# Patient Record
Sex: FEMALE | Race: WHITE | NOT HISPANIC OR LATINO | URBAN - METROPOLITAN AREA
[De-identification: names, ages, dates, MRNs, and addresses within clinical notes are randomized per-mention and may not be internally consistent; named-entity substitution may affect disease eponyms.]

---

## 2019-07-22 PROCEDURE — 87186 SC STD MICRODIL/AGAR DIL: CPT | Performed by: EMERGENCY MEDICINE

## 2019-07-22 PROCEDURE — 87086 URINE CULTURE/COLONY COUNT: CPT | Performed by: EMERGENCY MEDICINE

## 2019-07-22 PROCEDURE — 87088 URINE BACTERIA CULTURE: CPT | Performed by: EMERGENCY MEDICINE

## 2021-03-28 ENCOUNTER — IMMUNIZATION (OUTPATIENT)
Dept: LAB | Age: 21
End: 2021-03-28

## 2021-03-28 DIAGNOSIS — Z23 NEED FOR VACCINATION: Primary | ICD-10-CM

## 2021-03-28 PROCEDURE — 0001A COVID 19 PFIZER-BIONTECH: CPT

## 2021-03-28 PROCEDURE — 91300 COVID 19 PFIZER-BIONTECH: CPT

## 2021-04-19 ENCOUNTER — IMMUNIZATION (OUTPATIENT)
Dept: LAB | Age: 21
End: 2021-04-19
Attending: HOSPITALIST

## 2021-04-19 DIAGNOSIS — Z23 NEED FOR VACCINATION: Primary | ICD-10-CM

## 2021-04-19 PROCEDURE — 91300 COVID 19 PFIZER-BIONTECH: CPT

## 2021-04-19 PROCEDURE — 0002A COVID 19 PFIZER-BIONTECH: CPT

## 2022-01-11 ENCOUNTER — OFFICE VISIT (OUTPATIENT)
Dept: FAMILY MEDICINE CLINIC | Facility: CLINIC | Age: 22
End: 2022-01-11
Payer: COMMERCIAL

## 2022-01-11 VITALS — HEIGHT: 71.5 IN | BODY MASS INDEX: 24.24 KG/M2 | WEIGHT: 177 LBS

## 2022-01-11 DIAGNOSIS — U07.1 COVID-19: Primary | ICD-10-CM

## 2022-01-11 DIAGNOSIS — R09.82 PND (POST-NASAL DRIP): ICD-10-CM

## 2022-01-11 PROCEDURE — 3008F BODY MASS INDEX DOCD: CPT | Performed by: FAMILY MEDICINE

## 2022-01-11 PROCEDURE — 99203 OFFICE O/P NEW LOW 30 MIN: CPT | Performed by: FAMILY MEDICINE

## 2022-01-11 RX ORDER — FLUTICASONE PROPIONATE 50 MCG
SPRAY, SUSPENSION (ML) NASAL
COMMUNITY
Start: 2021-10-01

## 2022-01-11 RX ORDER — ALBUTEROL SULFATE 90 UG/1
2 AEROSOL, METERED RESPIRATORY (INHALATION) EVERY 4 HOURS PRN
COMMUNITY
Start: 2022-01-02

## 2022-01-11 NOTE — PROGRESS NOTES
No chief complaint on file. HPI:   Dago Akbar is a 19year old female who presents to the office as a new patient for evaluation of chest pressure. Exposed to [employer] on the 21st of Dec.  Tested 13 times since then - PCR and rapid.   All negat help, rest and hydrate  Given the duration, I would imagine that she is no longer contagious at this time. Continue to exercise caution until symptoms resolve.   For school, she has a week of virtual classes  It sounds like the chest symptoms she was havin

## 2022-03-13 NOTE — Clinical Note
Pediatric Discharge Summary Report       Admission Date: 3/8/2022  Discharge Date: 03/13/22    Discharge Diagnosis:Hypotension  Problem List:   Active Hospital Problems    Diagnosis    • Hypotension        Reason for Admission: Hypotension    HPI:  The patient is a 15-year-old female with a history of anxiety and depression who presents for hypotension after her blood pressure was noted to be 80s over 50s at PMD office. Patient reports she has a history of bicuspid aortic valve, and abnormal aortic arch for which she takes no medications or has not had surgeries, and has no activity restrictions.  Patient does endorse losing some weight over the past year and a half that is not specifically intentional, but does report she is very busy and cannot always eat more than 1 meal a day which is usually dinner.     Patient denies calorie counting, denies intentionally making herself vomit, denies excessive exercise reports she does not like to exercise. Patient endorses feeling lightheaded at times especially over the past few days to weeks which was brought up at PMDs office and then patient was sent to this ED.  Patient reports normal urine output, does endorse vaping nicotine, smoking weed, and occasional alcohol use, denies any other drugs. Patient reports she lives with her dad she feels safe at home, she has a sibling who lives in [place], and a brother who visits their home on the weekends, and parents are .  Patient does endorse a history of self injury but reports has not self injured in a couple of months.  Patient denies SI, does endorse at times feeling stressed and overwhelmed and wanting to start talking to a therapist or counselor again. Previous therapist was not a good fit.    When mom arrives mom reports she is concerned that patient is purposefully withholding eating and does not like how her body looks, mom also reports that patient disclosed that she intentionally tried to overdose on Benadryl  Referring:  Shante Paez MD  [address] [place]    Dear Dr. Melissa Amaya MD    Thank you for referring your patient to me for an evaluation. Please see my attached note for my findings and recommendations. Should you have any questions or concerns, please do not hesitate to contact me at the number listed below.       Best Regards,      Dorinda Chowdhury MD  [address] [address]  [phone] about a month ago and did not tell anybody about it until later.    ED Course:  Cardiology consulted in the ED regarding bicuspid aortic valve, EKG obtained showed NSR and borderline ST elevation, anterior leads likely benign repol N/S inf T changes. Labs obtained in ED - CMP unremarkable, Mag wnl, alcohol negative, UA unremarkable, acetaminophen level negative, salicylate level negative, phosphorus wnl, TSH wnl, CBC showed mildly elevated WBC at 12.0, troponin wnl, BNP wnl, covid negative, hcg negative. UDS positive for cannabis, though patient does endorse marijuana use. Admitted to the floor given weight loss and hypotension with plan for echo on the floor.    Hospital Course:  Patient was started on the nutritional rehabilitation protocol. Patient was noncompliant and refused the NG tube. Family was also noncompliant and refused the NG tube for the patient. Throughout hospital course, patient vomited and her nausea was managed with zofran. Patient was started on multivitamin, thiamine, zinc, and vitamin D supplements. BMP, Mag and phos were trended. BMP showed mild hyponatremia at 132 and 133 but resolved prior to discharge. Otherwise electrolytes remained stable. Patient complained of constipation and was treated with miralax, senokot and lactulose. Patient was deemed safe at time of discharge and recommended close outpatient follow up.     Echo showed bicuspid aortic valve, tricuspid regurgitation, and aortic root dilation.     Condition on Discharge: Stable  Discharge Instructions: Follow up with PCP and psych.  Activity:   Exercise restriction  Diet:  general diet    Follow-up: Patient advised to go to PCP office on 3/14 for close follow up.   PMD: Karel Hanson DO      Medications:      Summary of your Discharge Medications      Take these Medications      Details   childrens multivitamin with C & FA chewable tablet  Start taking on: March 14, 2022   Chew 1 tablet by mouth daily. Do not start before March  14, 2022.     docusate sodium-sennosides 50-8.6 MG per tablet  Commonly known as: SENOKOT S   Take 1 tablet by mouth 2 times daily as needed for Constipation.     polyethylene glycol 17 g packet  Commonly known as: MIRALAX   Take 17 g by mouth daily as needed (constipation). Stir and dissolve powder in any 4 to 8 ounces of beverage, then drink.     thiamine 100 MG tablet  Commonly known as: VITAMIN B1  Start taking on: March 14, 2022   Take 1 tablet by mouth daily. Do not start before March 14, 2022.     Vitamin D3 25 MCG Tab  Start taking on: March 14, 2022   Take 4 tablets by mouth daily. Do not start before March 14, 2022.  Comment: 25 mcg = 1,000 units     zinc sulfate 220 (50 Zn) MG capsule  Commonly known as: ZINCATE  Start taking on: March 14, 2022   Take 1 capsule by mouth daily. Do not start before March 14, 2022.            Discharge Exam:  Physical Exam  Constitutional:       General: She is not in acute distress.     Appearance: She is not toxic-appearing.   HENT:      Head: Normocephalic and atraumatic.      Right Ear: External ear normal.      Left Ear: External ear normal.      Nose: Nose normal. No congestion or rhinorrhea.      Mouth/Throat:      Mouth: Mucous membranes are moist.      Comments: Mouth piercing      Neck: Normal range of motion.   Eyes:      General:         Right eye: No discharge.         Left eye: No discharge.      Conjunctiva/sclera: Conjunctivae normal.   Cardiovascular:      Rate and Rhythm: Normal rate and regular rhythm.      Pulses: Normal pulses.      Heart sounds: Normal heart sounds. No murmur heard.    No friction rub. No gallop.   Pulmonary:      Effort: Pulmonary effort is normal. No respiratory distress.      Breath sounds: Normal breath sounds.   Abdominal:      General: Abdomen is flat. Bowel sounds are normal.      Palpations: Abdomen is soft.   Musculoskeletal:         General: Normal range of motion.   Skin:     General: Skin is warm and dry.      Capillary  Refill: Capillary refill takes less than 2 seconds.   Neurological:      Mental Status: She is alert.            Thank you for allowing us to participate in the care of this patient.       Sapphire Frias, DO  Pediatrics, PGY-1  3/13/2022 6:17 PM

## 2022-04-11 ENCOUNTER — OFFICE VISIT (OUTPATIENT)
Dept: FAMILY MEDICINE CLINIC | Facility: CLINIC | Age: 22
End: 2022-04-11
Payer: COMMERCIAL

## 2022-04-11 VITALS
HEIGHT: 71.75 IN | BODY MASS INDEX: 24.79 KG/M2 | HEART RATE: 103 BPM | SYSTOLIC BLOOD PRESSURE: 114 MMHG | DIASTOLIC BLOOD PRESSURE: 80 MMHG | RESPIRATION RATE: 14 BRPM | TEMPERATURE: 97 F | OXYGEN SATURATION: 98 % | WEIGHT: 181 LBS

## 2022-04-11 DIAGNOSIS — M79.10 MYALGIA: ICD-10-CM

## 2022-04-11 DIAGNOSIS — M25.50 POLYARTHRALGIA: ICD-10-CM

## 2022-04-11 DIAGNOSIS — R23.8 EASY BRUISING: Primary | ICD-10-CM

## 2022-04-11 DIAGNOSIS — Z86.16 HISTORY OF COVID-19: ICD-10-CM

## 2022-04-11 PROBLEM — H93.13 BILATERAL TINNITUS: Status: ACTIVE | Noted: 2022-04-11

## 2022-04-11 PROBLEM — H93.13 BILATERAL TINNITUS: Status: RESOLVED | Noted: 2022-04-11 | Resolved: 2022-04-11

## 2022-04-11 LAB
ALBUMIN SERPL-MCNC: 3.6 G/DL (ref 3.4–5)
ALBUMIN/GLOB SERPL: 1 {RATIO} (ref 1–2)
ALP LIVER SERPL-CCNC: 72 U/L
ALT SERPL-CCNC: 17 U/L
ANION GAP SERPL CALC-SCNC: 3 MMOL/L (ref 0–18)
AST SERPL-CCNC: 6 U/L (ref 15–37)
BASOPHILS # BLD AUTO: 0.03 X10(3) UL (ref 0–0.2)
BASOPHILS NFR BLD AUTO: 0.4 %
BILIRUB SERPL-MCNC: 0.4 MG/DL (ref 0.1–2)
BUN BLD-MCNC: 16 MG/DL (ref 7–18)
CALCIUM BLD-MCNC: 9.2 MG/DL (ref 8.5–10.1)
CHLORIDE SERPL-SCNC: 110 MMOL/L (ref 98–112)
CO2 SERPL-SCNC: 28 MMOL/L (ref 21–32)
CREAT BLD-MCNC: 0.89 MG/DL
CRP SERPL-MCNC: 2.42 MG/DL (ref ?–0.3)
EOSINOPHIL # BLD AUTO: 0.13 X10(3) UL (ref 0–0.7)
EOSINOPHIL NFR BLD AUTO: 1.7 %
ERYTHROCYTE [DISTWIDTH] IN BLOOD BY AUTOMATED COUNT: 12.8 %
ERYTHROCYTE [SEDIMENTATION RATE] IN BLOOD: 19 MM/HR
FASTING STATUS PATIENT QL REPORTED: NO
GLOBULIN PLAS-MCNC: 3.6 G/DL (ref 2.8–4.4)
GLUCOSE BLD-MCNC: 87 MG/DL (ref 70–99)
HCT VFR BLD AUTO: 44 %
HGB BLD-MCNC: 14.3 G/DL
IMM GRANULOCYTES # BLD AUTO: 0.03 X10(3) UL (ref 0–1)
IMM GRANULOCYTES NFR BLD: 0.4 %
LYMPHOCYTES # BLD AUTO: 1.16 X10(3) UL (ref 1–4)
LYMPHOCYTES NFR BLD AUTO: 15.1 %
MCH RBC QN AUTO: 29.9 PG (ref 26–34)
MCHC RBC AUTO-ENTMCNC: 32.5 G/DL (ref 31–37)
MCV RBC AUTO: 91.9 FL
MONOCYTES # BLD AUTO: 0.68 X10(3) UL (ref 0.1–1)
MONOCYTES NFR BLD AUTO: 8.9 %
NEUTROPHILS # BLD AUTO: 5.64 X10 (3) UL (ref 1.5–7.7)
NEUTROPHILS # BLD AUTO: 5.64 X10(3) UL (ref 1.5–7.7)
NEUTROPHILS NFR BLD AUTO: 73.5 %
OSMOLALITY SERPL CALC.SUM OF ELEC: 293 MOSM/KG (ref 275–295)
PLATELET # BLD AUTO: 229 10(3)UL (ref 150–450)
POTASSIUM SERPL-SCNC: 5.2 MMOL/L (ref 3.5–5.1)
PROT SERPL-MCNC: 7.2 G/DL (ref 6.4–8.2)
RBC # BLD AUTO: 4.79 X10(6)UL
RHEUMATOID FACT SERPL-ACNC: <10 IU/ML (ref ?–15)
SODIUM SERPL-SCNC: 141 MMOL/L (ref 136–145)
TSI SER-ACNC: 0.94 MIU/ML (ref 0.36–3.74)
WBC # BLD AUTO: 7.7 X10(3) UL (ref 4–11)

## 2022-04-11 PROCEDURE — 86431 RHEUMATOID FACTOR QUANT: CPT | Performed by: FAMILY MEDICINE

## 2022-04-11 PROCEDURE — 99213 OFFICE O/P EST LOW 20 MIN: CPT | Performed by: FAMILY MEDICINE

## 2022-04-11 PROCEDURE — 84443 ASSAY THYROID STIM HORMONE: CPT | Performed by: FAMILY MEDICINE

## 2022-04-11 PROCEDURE — 3079F DIAST BP 80-89 MM HG: CPT | Performed by: FAMILY MEDICINE

## 2022-04-11 PROCEDURE — 86038 ANTINUCLEAR ANTIBODIES: CPT | Performed by: FAMILY MEDICINE

## 2022-04-11 PROCEDURE — 86140 C-REACTIVE PROTEIN: CPT | Performed by: FAMILY MEDICINE

## 2022-04-11 PROCEDURE — 85652 RBC SED RATE AUTOMATED: CPT | Performed by: FAMILY MEDICINE

## 2022-04-11 PROCEDURE — 80053 COMPREHEN METABOLIC PANEL: CPT | Performed by: FAMILY MEDICINE

## 2022-04-11 PROCEDURE — 85025 COMPLETE CBC W/AUTO DIFF WBC: CPT | Performed by: FAMILY MEDICINE

## 2022-04-11 PROCEDURE — 36415 COLL VENOUS BLD VENIPUNCTURE: CPT | Performed by: FAMILY MEDICINE

## 2022-04-11 PROCEDURE — 3074F SYST BP LT 130 MM HG: CPT | Performed by: FAMILY MEDICINE

## 2022-04-11 PROCEDURE — 3008F BODY MASS INDEX DOCD: CPT | Performed by: FAMILY MEDICINE

## 2022-04-11 RX ORDER — IBUPROFEN 200 MG
400 TABLET ORAL EVERY 6 HOURS PRN
COMMUNITY

## 2022-04-11 NOTE — PROGRESS NOTES
Patient came in for draw of ordered fasting labs. Patient drawn out of R AC, x 1 attempt and tolerated well.  2 gold and 1 lav tube drawn.

## 2022-04-12 ENCOUNTER — TELEPHONE (OUTPATIENT)
Dept: FAMILY MEDICINE CLINIC | Facility: CLINIC | Age: 22
End: 2022-04-12

## 2022-04-12 NOTE — TELEPHONE ENCOUNTER
----- Message from Kindra Park MD sent at 4/11/2022 10:20 PM CDT -----  Please call pt to inform (since she did not set up [place]  access yet):  - potassium is elevated slightly at 5.2 (hemolyzed sample?)  - inflammatory markers: ESR normal, CRP elevated (but these are nonspecific and difficult to determine what is triggering thi)  - CBC, CMP, TSH normal  - Rheum factor neg    Awaiting 3 more blood test results (MILLA, Lyme, and another Rheum Arthritis marker)    Thanks.

## 2022-04-15 ENCOUNTER — LAB ENCOUNTER (OUTPATIENT)
Dept: LAB | Age: 22
End: 2022-04-15
Attending: FAMILY MEDICINE
Payer: COMMERCIAL

## 2022-04-15 DIAGNOSIS — M79.10 MYALGIA: ICD-10-CM

## 2022-04-15 DIAGNOSIS — M25.50 POLYARTHRALGIA: ICD-10-CM

## 2022-04-15 LAB — ANA SER QL: NEGATIVE

## 2022-04-15 PROCEDURE — 36415 COLL VENOUS BLD VENIPUNCTURE: CPT

## 2022-04-15 PROCEDURE — 86200 CCP ANTIBODY: CPT

## 2022-04-15 PROCEDURE — 87476 LYME DIS DNA AMP PROBE: CPT

## 2022-04-19 LAB — CCP IGG SERPL-ACNC: 2 U/ML (ref 0–6.9)

## 2022-04-20 ENCOUNTER — TELEPHONE (OUTPATIENT)
Dept: FAMILY MEDICINE CLINIC | Facility: CLINIC | Age: 22
End: 2022-04-20

## 2022-04-20 LAB — BORRELIA SPECIES DNA DETECTION: NOT DETECTED

## 2022-04-20 NOTE — TELEPHONE ENCOUNTER
Pt returned nurses call, states she was able to activate my-chart and review test results. Pt  Noted CRP elevated and wanted clarification. Pt informed of test results per indications of . CRP elevated (but these are nonspecific and difficult to determine what is triggering this)      All other test results normal and awaiting lyme disease result which is in process. Will further inform pt based on test results. Pt voiced understanding.

## 2022-04-21 ENCOUNTER — TELEPHONE (OUTPATIENT)
Dept: FAMILY MEDICINE CLINIC | Facility: CLINIC | Age: 22
End: 2022-04-21

## 2022-04-21 NOTE — TELEPHONE ENCOUNTER
Left message for patient that referral information has been sent via [employer]. She is to call office with any further questions.

## 2022-04-28 ENCOUNTER — TELEPHONE (OUTPATIENT)
Dept: FAMILY MEDICINE CLINIC | Facility: CLINIC | Age: 22
End: 2022-04-28

## 2022-04-28 NOTE — TELEPHONE ENCOUNTER
June/July appts are about the typical wait time right now for Rheumatology. Did she try to get an appt with anyone in that office (Celia Elliott, or [facility]?)  Or she can get on a cancellation list?    If not, she can try getting in with Dr. Kalli Valladares at [facility][1] in [place]. See new referral. Thanks.

## 2022-04-28 NOTE — TELEPHONE ENCOUNTER
Spoke to pt informed of indications per  Pt voiced understanding, states she has been placed in cancellation list. Pt also provided with information to [facility] and will call to see if she can schedule a sooner apt

## 2022-04-28 NOTE — TELEPHONE ENCOUNTER
Pt called office asking if she could get a new referral for a new Rheumatologist. The one she was given does not have appt until July.

## 2022-06-01 ENCOUNTER — WALK IN (OUTPATIENT)
Dept: URGENT CARE | Age: 22
End: 2022-06-01

## 2022-06-01 VITALS
WEIGHT: 175 LBS | HEART RATE: 90 BPM | OXYGEN SATURATION: 100 % | DIASTOLIC BLOOD PRESSURE: 70 MMHG | RESPIRATION RATE: 18 BRPM | HEIGHT: 72 IN | BODY MASS INDEX: 23.7 KG/M2 | SYSTOLIC BLOOD PRESSURE: 125 MMHG | TEMPERATURE: 98.8 F

## 2022-06-01 DIAGNOSIS — T14.8XXA ANIMAL BITE: Primary | ICD-10-CM

## 2022-06-01 PROCEDURE — 90715 TDAP VACCINE 7 YRS/> IM: CPT | Performed by: EMERGENCY MEDICINE

## 2022-06-01 PROCEDURE — 99202 OFFICE O/P NEW SF 15 MIN: CPT | Performed by: EMERGENCY MEDICINE

## 2022-06-01 PROCEDURE — 90471 IMMUNIZATION ADMIN: CPT | Performed by: EMERGENCY MEDICINE

## 2022-06-01 RX ORDER — AMOXICILLIN AND CLAVULANATE POTASSIUM 875; 125 MG/1; MG/1
1 TABLET, FILM COATED ORAL 2 TIMES DAILY
Qty: 14 TABLET | Refills: 0 | Status: SHIPPED | OUTPATIENT
Start: 2022-06-01 | End: 2022-06-08

## 2022-06-01 RX ORDER — LEVONORGESTREL 52 MG/1
1 INTRAUTERINE DEVICE INTRAUTERINE
COMMUNITY

## 2022-06-01 RX ORDER — IBUPROFEN 200 MG
400 TABLET ORAL
COMMUNITY

## 2022-06-01 RX ORDER — TRIAMCINOLONE ACETONIDE 1 MG/G
OINTMENT TOPICAL
COMMUNITY
Start: 2022-05-09

## 2022-06-01 ASSESSMENT — PAIN SCALES - GENERAL: PAINLEVEL: 7

## 2022-06-02 ENCOUNTER — HOSPITAL ENCOUNTER (EMERGENCY)
Facility: HOSPITAL | Age: 22
Discharge: HOME | End: 2022-06-02
Attending: EMERGENCY MEDICINE
Payer: COMMERCIAL

## 2022-06-02 VITALS
SYSTOLIC BLOOD PRESSURE: 131 MMHG | WEIGHT: 175 LBS | HEART RATE: 99 BPM | RESPIRATION RATE: 16 BRPM | OXYGEN SATURATION: 100 % | BODY MASS INDEX: 23.7 KG/M2 | DIASTOLIC BLOOD PRESSURE: 79 MMHG | TEMPERATURE: 97 F | HEIGHT: 72 IN

## 2022-06-02 DIAGNOSIS — L03.114 CELLULITIS OF LEFT HAND: Primary | ICD-10-CM

## 2022-06-02 LAB
BASOPHILS # BLD AUTO: 0.03 X10(3) UL (ref 0–0.2)
BASOPHILS NFR BLD AUTO: 0.3 %
EOSINOPHIL # BLD AUTO: 0.07 X10(3) UL (ref 0–0.7)
EOSINOPHIL NFR BLD AUTO: 0.7 %
ERYTHROCYTE [DISTWIDTH] IN BLOOD BY AUTOMATED COUNT: 13.4 %
HCT VFR BLD AUTO: 39.5 %
HGB BLD-MCNC: 13.1 G/DL
IMM GRANULOCYTES # BLD AUTO: 0.02 X10(3) UL (ref 0–1)
IMM GRANULOCYTES NFR BLD: 0.2 %
LYMPHOCYTES # BLD AUTO: 1.43 X10(3) UL (ref 1–4)
LYMPHOCYTES NFR BLD AUTO: 14.5 %
MCH RBC QN AUTO: 29.6 PG (ref 26–34)
MCHC RBC AUTO-ENTMCNC: 33.2 G/DL (ref 31–37)
MCV RBC AUTO: 89.4 FL
MONOCYTES # BLD AUTO: 1.04 X10(3) UL (ref 0.1–1)
MONOCYTES NFR BLD AUTO: 10.5 %
NEUTROPHILS # BLD AUTO: 7.27 X10 (3) UL (ref 1.5–7.7)
NEUTROPHILS # BLD AUTO: 7.27 X10(3) UL (ref 1.5–7.7)
NEUTROPHILS NFR BLD AUTO: 73.8 %
PLATELET # BLD AUTO: 134 10(3)UL (ref 150–450)
RBC # BLD AUTO: 4.42 X10(6)UL
WBC # BLD AUTO: 9.9 X10(3) UL (ref 4–11)

## 2022-06-02 PROCEDURE — 99284 EMERGENCY DEPT VISIT MOD MDM: CPT

## 2022-06-02 PROCEDURE — 99283 EMERGENCY DEPT VISIT LOW MDM: CPT

## 2022-06-02 PROCEDURE — 96365 THER/PROPH/DIAG IV INF INIT: CPT

## 2022-06-02 PROCEDURE — 85025 COMPLETE CBC W/AUTO DIFF WBC: CPT | Performed by: EMERGENCY MEDICINE

## 2022-06-02 NOTE — ED INITIAL ASSESSMENT (HPI)
Pt states yesterday bit by her own bird on left hand. Pt states at work she was also scratched by a cat in the same spot. Pt seen at [facility] yesterday, was prescribed ABX and has taken 2 doses so far. Pt has increased redness and swelling to left hand.

## 2022-06-03 ENCOUNTER — HOSPITAL ENCOUNTER (EMERGENCY)
Facility: HOSPITAL | Age: 22
Discharge: HOME | End: 2022-06-03
Attending: PEDIATRICS
Payer: COMMERCIAL

## 2022-06-03 ENCOUNTER — TELEPHONE (OUTPATIENT)
Dept: ORTHOPEDICS CLINIC | Facility: CLINIC | Age: 22
End: 2022-06-03

## 2022-06-03 VITALS
OXYGEN SATURATION: 98 % | SYSTOLIC BLOOD PRESSURE: 117 MMHG | WEIGHT: 175 LBS | BODY MASS INDEX: 23.7 KG/M2 | HEIGHT: 72 IN | RESPIRATION RATE: 20 BRPM | TEMPERATURE: 98 F | HEART RATE: 76 BPM | DIASTOLIC BLOOD PRESSURE: 79 MMHG

## 2022-06-03 DIAGNOSIS — L03.119 CELLULITIS OF HAND: Primary | ICD-10-CM

## 2022-06-03 DIAGNOSIS — T14.8XXA ANIMAL BITE: ICD-10-CM

## 2022-06-03 PROCEDURE — 96365 THER/PROPH/DIAG IV INF INIT: CPT

## 2022-06-03 PROCEDURE — 99284 EMERGENCY DEPT VISIT MOD MDM: CPT

## 2022-06-03 NOTE — ED INITIAL ASSESSMENT (HPI)
Dog bite possible with cellulitis seen yesterday, worsening redness and swelling. Pt given unasyn to treat.

## 2022-06-03 NOTE — TELEPHONE ENCOUNTER
Patient calling requesting to be seen by provider for a bird bite to the left hand. Please advise if okay to schedule patient.

## 2022-06-03 NOTE — TELEPHONE ENCOUNTER
Francine Gimenez MD  You Just now (9:35 AM)     End of next week     Please schedule patient for appt end of next week, per Dr. Sharda Zapata. Thank you.

## 2022-07-05 ENCOUNTER — OFFICE VISIT (OUTPATIENT)
Dept: RHEUMATOLOGY | Facility: CLINIC | Age: 22
End: 2022-07-05
Payer: COMMERCIAL

## 2022-07-05 VITALS
SYSTOLIC BLOOD PRESSURE: 120 MMHG | BODY MASS INDEX: 23.98 KG/M2 | WEIGHT: 177 LBS | HEIGHT: 72 IN | HEART RATE: 94 BPM | DIASTOLIC BLOOD PRESSURE: 70 MMHG | OXYGEN SATURATION: 99 %

## 2022-07-05 DIAGNOSIS — R07.89 ATYPICAL CHEST PAIN: ICD-10-CM

## 2022-07-05 DIAGNOSIS — M25.50 HYPERMOBILITY ARTHRALGIA: ICD-10-CM

## 2022-07-05 DIAGNOSIS — M24.80 GENERALIZED HYPERMOBILITY OF JOINTS: ICD-10-CM

## 2022-07-05 DIAGNOSIS — Z82.69 FAMILY HISTORY OF ANKYLOSING SPONDYLITIS: ICD-10-CM

## 2022-07-05 DIAGNOSIS — Z20.818 STREPTOCOCCUS EXPOSURE: ICD-10-CM

## 2022-07-05 DIAGNOSIS — Z11.59 NEED FOR HEPATITIS B SCREENING TEST: ICD-10-CM

## 2022-07-05 DIAGNOSIS — M06.4 INFLAMMATORY POLYARTHRITIS (HCC): Primary | ICD-10-CM

## 2022-07-05 DIAGNOSIS — Z11.59 NEED FOR HEPATITIS C SCREENING TEST: ICD-10-CM

## 2022-07-05 PROCEDURE — 3008F BODY MASS INDEX DOCD: CPT | Performed by: INTERNAL MEDICINE

## 2022-07-05 PROCEDURE — 99244 OFF/OP CNSLTJ NEW/EST MOD 40: CPT | Performed by: INTERNAL MEDICINE

## 2022-07-05 PROCEDURE — 3074F SYST BP LT 130 MM HG: CPT | Performed by: INTERNAL MEDICINE

## 2022-07-05 PROCEDURE — 3078F DIAST BP <80 MM HG: CPT | Performed by: INTERNAL MEDICINE

## 2022-07-08 NOTE — PROGRESS NOTES
Phoned health plan, no PA required for Echo. [facility]#[phone].  Phoned pt and notified of approval.

## 2022-10-01 ENCOUNTER — HOSPITAL ENCOUNTER (OUTPATIENT)
Dept: CV DIAGNOSTICS | Facility: HOSPITAL | Age: 22
Discharge: HOME | End: 2022-10-01
Attending: INTERNAL MEDICINE
Payer: COMMERCIAL

## 2022-10-01 DIAGNOSIS — M06.4 INFLAMMATORY POLYARTHRITIS (HCC): ICD-10-CM

## 2022-10-01 DIAGNOSIS — M25.50 HYPERMOBILITY ARTHRALGIA: ICD-10-CM

## 2022-10-01 DIAGNOSIS — Z82.69 FAMILY HISTORY OF ANKYLOSING SPONDYLITIS: ICD-10-CM

## 2022-10-01 DIAGNOSIS — M24.80 GENERALIZED HYPERMOBILITY OF JOINTS: ICD-10-CM

## 2022-10-01 PROCEDURE — 93306 TTE W/DOPPLER COMPLETE: CPT | Performed by: INTERNAL MEDICINE

## (undated) DIAGNOSIS — M25.50 POLYARTHRALGIA: Primary | ICD-10-CM

## (undated) DIAGNOSIS — M79.10 MYALGIA: ICD-10-CM